# Patient Record
Sex: MALE | Race: WHITE | NOT HISPANIC OR LATINO | Employment: FULL TIME | URBAN - METROPOLITAN AREA
[De-identification: names, ages, dates, MRNs, and addresses within clinical notes are randomized per-mention and may not be internally consistent; named-entity substitution may affect disease eponyms.]

---

## 2019-07-30 ENCOUNTER — OFFICE VISIT CONVERTED (OUTPATIENT)
Dept: FAMILY MEDICINE CLINIC | Facility: CLINIC | Age: 59
End: 2019-07-30
Attending: NURSE PRACTITIONER

## 2019-07-30 ENCOUNTER — CONVERSION ENCOUNTER (OUTPATIENT)
Dept: OTHER | Facility: HOSPITAL | Age: 59
End: 2019-07-30

## 2019-07-30 ENCOUNTER — HOSPITAL ENCOUNTER (OUTPATIENT)
Dept: GENERAL RADIOLOGY | Facility: HOSPITAL | Age: 59
Discharge: HOME | End: 2019-07-30
Attending: NURSE PRACTITIONER

## 2021-01-01 ENCOUNTER — OFFICE VISIT CONVERTED (OUTPATIENT)
Dept: FAMILY MEDICINE CLINIC | Facility: CLINIC | Age: 61
End: 2021-01-01
Attending: STUDENT IN AN ORGANIZED HEALTH CARE EDUCATION/TRAINING PROGRAM

## 2021-01-01 ENCOUNTER — HOSPITAL ENCOUNTER (OUTPATIENT)
Dept: LAB | Facility: HOSPITAL | Age: 61
Discharge: HOME | End: 2021-05-20
Attending: STUDENT IN AN ORGANIZED HEALTH CARE EDUCATION/TRAINING PROGRAM

## 2021-01-01 ENCOUNTER — TRANSCRIBE ORDERS (OUTPATIENT)
Dept: ADMINISTRATIVE | Facility: HOSPITAL | Age: 61
End: 2021-01-01

## 2021-01-01 ENCOUNTER — TELEPHONE (OUTPATIENT)
Dept: FAMILY MEDICINE CLINIC | Facility: CLINIC | Age: 61
End: 2021-01-01

## 2021-01-01 ENCOUNTER — PROCEDURE VISIT CONVERTED (OUTPATIENT)
Dept: PODIATRY | Facility: CLINIC | Age: 61
End: 2021-01-01
Attending: PODIATRIST

## 2021-01-01 ENCOUNTER — OFFICE VISIT (OUTPATIENT)
Dept: FAMILY MEDICINE CLINIC | Facility: CLINIC | Age: 61
End: 2021-01-01

## 2021-01-01 ENCOUNTER — HOSPITAL ENCOUNTER (OUTPATIENT)
Dept: GENERAL RADIOLOGY | Facility: HOSPITAL | Age: 61
Discharge: HOME | End: 2021-07-02
Admitting: STUDENT IN AN ORGANIZED HEALTH CARE EDUCATION/TRAINING PROGRAM

## 2021-01-01 ENCOUNTER — OFFICE VISIT CONVERTED (OUTPATIENT)
Dept: ORTHOPEDIC SURGERY | Facility: CLINIC | Age: 61
End: 2021-01-01
Attending: STUDENT IN AN ORGANIZED HEALTH CARE EDUCATION/TRAINING PROGRAM

## 2021-01-01 ENCOUNTER — LAB (OUTPATIENT)
Dept: LAB | Facility: HOSPITAL | Age: 61
End: 2021-01-01

## 2021-01-01 ENCOUNTER — HOSPITAL ENCOUNTER (OUTPATIENT)
Dept: LAB | Facility: HOSPITAL | Age: 61
Discharge: HOME | End: 2021-01-28
Attending: STUDENT IN AN ORGANIZED HEALTH CARE EDUCATION/TRAINING PROGRAM

## 2021-01-01 ENCOUNTER — CONVERSION ENCOUNTER (OUTPATIENT)
Dept: FAMILY MEDICINE CLINIC | Facility: CLINIC | Age: 61
End: 2021-01-01

## 2021-01-01 ENCOUNTER — OFFICE VISIT CONVERTED (OUTPATIENT)
Dept: PODIATRY | Facility: CLINIC | Age: 61
End: 2021-01-01
Attending: PODIATRIST

## 2021-01-01 ENCOUNTER — HOSPITAL ENCOUNTER (EMERGENCY)
Facility: HOSPITAL | Age: 61
End: 2021-11-25
Attending: EMERGENCY MEDICINE | Admitting: EMERGENCY MEDICINE

## 2021-01-01 VITALS
HEART RATE: 95 BPM | SYSTOLIC BLOOD PRESSURE: 145 MMHG | DIASTOLIC BLOOD PRESSURE: 75 MMHG | BODY MASS INDEX: 36.5 KG/M2 | OXYGEN SATURATION: 99 % | TEMPERATURE: 97.8 F | WEIGHT: 269.5 LBS | HEIGHT: 72 IN

## 2021-01-01 VITALS
OXYGEN SATURATION: 94 % | RESPIRATION RATE: 16 BRPM | DIASTOLIC BLOOD PRESSURE: 80 MMHG | SYSTOLIC BLOOD PRESSURE: 120 MMHG | TEMPERATURE: 96.9 F | HEART RATE: 74 BPM | BODY MASS INDEX: 36.7 KG/M2 | WEIGHT: 271 LBS | HEIGHT: 72 IN

## 2021-01-01 VITALS
OXYGEN SATURATION: 98 % | HEIGHT: 72 IN | SYSTOLIC BLOOD PRESSURE: 120 MMHG | WEIGHT: 280 LBS | RESPIRATION RATE: 16 BRPM | BODY MASS INDEX: 37.93 KG/M2 | DIASTOLIC BLOOD PRESSURE: 76 MMHG | TEMPERATURE: 97.5 F | HEART RATE: 88 BPM

## 2021-01-01 VITALS
HEART RATE: 66 BPM | OXYGEN SATURATION: 80 % | WEIGHT: 268 LBS | HEIGHT: 72 IN | TEMPERATURE: 97.2 F | BODY MASS INDEX: 36.3 KG/M2

## 2021-01-01 VITALS
HEIGHT: 72 IN | BODY MASS INDEX: 36.07 KG/M2 | DIASTOLIC BLOOD PRESSURE: 63 MMHG | SYSTOLIC BLOOD PRESSURE: 142 MMHG | WEIGHT: 266.31 LBS | RESPIRATION RATE: 16 BRPM | OXYGEN SATURATION: 96 % | HEART RATE: 74 BPM | TEMPERATURE: 96.9 F

## 2021-01-01 VITALS
HEIGHT: 72 IN | HEART RATE: 75 BPM | DIASTOLIC BLOOD PRESSURE: 58 MMHG | OXYGEN SATURATION: 98 % | BODY MASS INDEX: 38.19 KG/M2 | TEMPERATURE: 96.7 F | SYSTOLIC BLOOD PRESSURE: 139 MMHG | WEIGHT: 282 LBS

## 2021-01-01 VITALS
HEART RATE: 75 BPM | OXYGEN SATURATION: 93 % | DIASTOLIC BLOOD PRESSURE: 79 MMHG | WEIGHT: 264 LBS | SYSTOLIC BLOOD PRESSURE: 160 MMHG | TEMPERATURE: 97.1 F

## 2021-01-01 VITALS
HEART RATE: 74 BPM | DIASTOLIC BLOOD PRESSURE: 63 MMHG | OXYGEN SATURATION: 98 % | HEIGHT: 72 IN | SYSTOLIC BLOOD PRESSURE: 123 MMHG | TEMPERATURE: 97.6 F | WEIGHT: 293.56 LBS | RESPIRATION RATE: 18 BRPM | BODY MASS INDEX: 39.76 KG/M2

## 2021-01-01 VITALS — OXYGEN SATURATION: 87 %

## 2021-01-01 DIAGNOSIS — I10 PRIMARY HYPERTENSION: ICD-10-CM

## 2021-01-01 DIAGNOSIS — I25.10 CORONARY ARTERY DISEASE INVOLVING NATIVE CORONARY ARTERY OF NATIVE HEART WITHOUT ANGINA PECTORIS: ICD-10-CM

## 2021-01-01 DIAGNOSIS — Z95.5 STENTED CORONARY ARTERY: ICD-10-CM

## 2021-01-01 DIAGNOSIS — I46.9 CARDIAC ARREST (HCC): Primary | ICD-10-CM

## 2021-01-01 DIAGNOSIS — E11.51 TYPE 2 DIABETES MELLITUS WITH DIABETIC PERIPHERAL ANGIOPATHY WITHOUT GANGRENE, WITHOUT LONG-TERM CURRENT USE OF INSULIN (HCC): Primary | ICD-10-CM

## 2021-01-01 DIAGNOSIS — Z12.11 SCREEN FOR COLON CANCER: ICD-10-CM

## 2021-01-01 DIAGNOSIS — M65.339 TRIGGER MIDDLE FINGER, UNSPECIFIED LATERALITY: ICD-10-CM

## 2021-01-01 DIAGNOSIS — E11.42 DIABETIC PERIPHERAL NEUROPATHY (HCC): ICD-10-CM

## 2021-01-01 DIAGNOSIS — Z23 NEEDS FLU SHOT: ICD-10-CM

## 2021-01-01 DIAGNOSIS — I51.9 CARDIAC DISEASE: ICD-10-CM

## 2021-01-01 DIAGNOSIS — E11.9 TYPE 2 DIABETES MELLITUS WITHOUT COMPLICATIONS (HCC): ICD-10-CM

## 2021-01-01 DIAGNOSIS — I10 ESSENTIAL HYPERTENSION: ICD-10-CM

## 2021-01-01 DIAGNOSIS — F17.200 SMOKING: ICD-10-CM

## 2021-01-01 DIAGNOSIS — M25.562 LEFT KNEE PAIN, UNSPECIFIED CHRONICITY: ICD-10-CM

## 2021-01-01 DIAGNOSIS — Z87.891 PERSONAL HISTORY OF TOBACCO USE, PRESENTING HAZARDS TO HEALTH: Primary | ICD-10-CM

## 2021-01-01 DIAGNOSIS — E11.65 TYPE 2 DIABETES MELLITUS WITH HYPERGLYCEMIA, WITHOUT LONG-TERM CURRENT USE OF INSULIN (HCC): ICD-10-CM

## 2021-01-01 DIAGNOSIS — E11.65 TYPE 2 DIABETES MELLITUS WITH HYPERGLYCEMIA, WITHOUT LONG-TERM CURRENT USE OF INSULIN (HCC): Primary | ICD-10-CM

## 2021-01-01 LAB
ALBUMIN SERPL-MCNC: 4.2 G/DL (ref 3.5–5)
ALBUMIN SERPL-MCNC: 4.3 G/DL (ref 3.5–5)
ALBUMIN/GLOB SERPL: 1.4 {RATIO} (ref 1.4–2.6)
ALBUMIN/GLOB SERPL: 1.6 {RATIO} (ref 1.4–2.6)
ALP SERPL-CCNC: 101 U/L (ref 56–119)
ALP SERPL-CCNC: 70 U/L (ref 56–119)
ALT SERPL-CCNC: 21 U/L (ref 10–40)
ALT SERPL-CCNC: 7 U/L (ref 10–40)
ANION GAP SERPL CALC-SCNC: 13 MMOL/L (ref 8–19)
ANION GAP SERPL CALC-SCNC: 22 MMOL/L (ref 8–19)
ANION GAP SERPL CALCULATED.3IONS-SCNC: 9.8 MMOL/L (ref 5–15)
AST SERPL-CCNC: 12 U/L (ref 15–50)
AST SERPL-CCNC: 8 U/L (ref 15–50)
BASOPHILS # BLD AUTO: 0.06 10*3/UL (ref 0–0.2)
BASOPHILS NFR BLD AUTO: 0.8 % (ref 0–3)
BILIRUB SERPL-MCNC: 0.24 MG/DL (ref 0.2–1.3)
BILIRUB SERPL-MCNC: 0.4 MG/DL (ref 0.2–1.3)
BUN SERPL-MCNC: 22 MG/DL (ref 5–25)
BUN SERPL-MCNC: 29 MG/DL (ref 5–25)
BUN SERPL-MCNC: 34 MG/DL (ref 8–23)
BUN/CREAT SERPL: 18 {RATIO} (ref 6–20)
BUN/CREAT SERPL: 23 {RATIO} (ref 6–20)
BUN/CREAT SERPL: 32.7 (ref 7–25)
CALCIUM SERPL-MCNC: 10 MG/DL (ref 8.7–10.4)
CALCIUM SERPL-MCNC: 9.4 MG/DL (ref 8.7–10.4)
CALCIUM SPEC-SCNC: 10.1 MG/DL (ref 8.6–10.5)
CHLORIDE SERPL-SCNC: 103 MMOL/L (ref 99–111)
CHLORIDE SERPL-SCNC: 104 MMOL/L (ref 98–107)
CHLORIDE SERPL-SCNC: 97 MMOL/L (ref 99–111)
CHOLEST SERPL-MCNC: 130 MG/DL (ref 107–200)
CHOLEST SERPL-MCNC: 132 MG/DL (ref 107–200)
CHOLEST/HDLC SERPL: 3.8 {RATIO} (ref 3–6)
CHOLEST/HDLC SERPL: 4.2 {RATIO} (ref 3–6)
CO2 SERPL-SCNC: 28.2 MMOL/L (ref 22–29)
CONV ABS IMM GRAN: 0.02 10*3/UL (ref 0–0.2)
CONV CO2: 23 MMOL/L (ref 22–32)
CONV CO2: 26 MMOL/L (ref 22–32)
CONV CREATININE URINE, RANDOM: 47.8 MG/DL (ref 10–300)
CONV CREATININE URINE, RANDOM: 53.4 MG/DL (ref 10–300)
CONV IMMATURE GRAN: 0.3 % (ref 0–1.8)
CONV MICROALBUM.,U,RANDOM: 224.4 MG/L (ref 0–20)
CONV MICROALBUM.,U,RANDOM: 276.9 MG/L (ref 0–20)
CONV TOTAL PROTEIN: 6.8 G/DL (ref 6.3–8.2)
CONV TOTAL PROTEIN: 7.3 G/DL (ref 6.3–8.2)
CREAT SERPL-MCNC: 1.04 MG/DL (ref 0.76–1.27)
CREAT UR-MCNC: 1.23 MG/DL (ref 0.7–1.2)
CREAT UR-MCNC: 1.26 MG/DL (ref 0.7–1.2)
DEPRECATED RDW RBC AUTO: 41.5 FL (ref 35.1–43.9)
EOSINOPHIL # BLD AUTO: 0.17 10*3/UL (ref 0–0.7)
EOSINOPHIL # BLD AUTO: 2.2 % (ref 0–7)
ERYTHROCYTE [DISTWIDTH] IN BLOOD BY AUTOMATED COUNT: 12.8 % (ref 11.6–14.4)
EST. AVERAGE GLUCOSE BLD GHB EST-MCNC: 220 MG/DL
EST. AVERAGE GLUCOSE BLD GHB EST-MCNC: 496 MG/DL
GFR SERPL CREATININE-BSD FRML MDRD: 73 ML/MIN/1.73
GFR SERPLBLD BASED ON 1.73 SQ M-ARVRAT: >60 ML/MIN/{1.73_M2}
GFR SERPLBLD BASED ON 1.73 SQ M-ARVRAT: >60 ML/MIN/{1.73_M2}
GLOBULIN UR ELPH-MCNC: 2.6 G/DL (ref 2–3.5)
GLOBULIN UR ELPH-MCNC: 3 G/DL (ref 2–3.5)
GLUCOSE SERPL-MCNC: 127 MG/DL (ref 65–99)
GLUCOSE SERPL-MCNC: 158 MG/DL (ref 70–99)
GLUCOSE SERPL-MCNC: 183 MG/DL
GLUCOSE SERPL-MCNC: 432 MG/DL (ref 70–99)
HBA1C MFR BLD: 6.8 % (ref 4.8–5.6)
HBA1C MFR BLD: 9.3 % (ref 3.5–5.7)
HBA1C MFR BLD: >18.9 % (ref 3.5–5.7)
HCT VFR BLD AUTO: 47.8 % (ref 42–52)
HDLC SERPL-MCNC: 31 MG/DL (ref 40–60)
HDLC SERPL-MCNC: 35 MG/DL (ref 40–60)
HGB BLD-MCNC: 16 G/DL (ref 14–18)
LDLC SERPL CALC-MCNC: 63 MG/DL (ref 70–100)
LDLC SERPL CALC-MCNC: 68 MG/DL (ref 70–100)
LYMPHOCYTES # BLD AUTO: 1.95 10*3/UL (ref 1–5)
LYMPHOCYTES NFR BLD AUTO: 24.8 % (ref 20–45)
MCH RBC QN AUTO: 29.8 PG (ref 27–31)
MCHC RBC AUTO-ENTMCNC: 33.5 G/DL (ref 33–37)
MCV RBC AUTO: 89 FL (ref 80–96)
MICROALBUMIN/CREAT UR: 420.2 MG/G{CRE} (ref 0–25)
MICROALBUMIN/CREAT UR: 579.3 MG/G{CRE} (ref 0–25)
MONOCYTES # BLD AUTO: 0.45 10*3/UL (ref 0.2–1.2)
MONOCYTES NFR BLD AUTO: 5.7 % (ref 3–10)
NEUTROPHILS # BLD AUTO: 5.21 10*3/UL (ref 2–8)
NEUTROPHILS NFR BLD AUTO: 66.2 % (ref 30–85)
NRBC CBCN: 0 % (ref 0–0.7)
OSMOLALITY SERPL CALC.SUM OF ELEC: 293 MOSM/KG (ref 273–304)
OSMOLALITY SERPL CALC.SUM OF ELEC: 306 MOSM/KG (ref 273–304)
PLATELET # BLD AUTO: 195 10*3/UL (ref 130–400)
PMV BLD AUTO: 11.4 FL (ref 9.4–12.4)
POTASSIUM SERPL-SCNC: 4.6 MMOL/L (ref 3.5–5.2)
POTASSIUM SERPL-SCNC: 4.7 MMOL/L (ref 3.5–5.3)
POTASSIUM SERPL-SCNC: 5.1 MMOL/L (ref 3.5–5.3)
PSA SERPL-MCNC: 0.19 NG/ML (ref 0–4)
RBC # BLD AUTO: 5.37 10*6/UL (ref 4.7–6.1)
SODIUM SERPL-SCNC: 137 MMOL/L (ref 135–147)
SODIUM SERPL-SCNC: 137 MMOL/L (ref 135–147)
SODIUM SERPL-SCNC: 142 MMOL/L (ref 136–145)
TRIGL SERPL-MCNC: 143 MG/DL (ref 40–150)
TRIGL SERPL-MCNC: 182 MG/DL (ref 40–150)
TSH SERPL-ACNC: 3.34 M[IU]/L (ref 0.27–4.2)
VLDLC SERPL-MCNC: 29 MG/DL (ref 5–37)
VLDLC SERPL-MCNC: 36 MG/DL (ref 5–37)
WBC # BLD AUTO: 7.86 10*3/UL (ref 4.8–10.8)

## 2021-01-01 PROCEDURE — 25010000002 EPINEPHRINE 1 MG/10ML SOLUTION PREFILLED SYRINGE: Performed by: EMERGENCY MEDICINE

## 2021-01-01 PROCEDURE — 92950 HEART/LUNG RESUSCITATION CPR: CPT

## 2021-01-01 PROCEDURE — 99283 EMERGENCY DEPT VISIT LOW MDM: CPT

## 2021-01-01 PROCEDURE — 31500 INSERT EMERGENCY AIRWAY: CPT

## 2021-01-01 PROCEDURE — 90471 IMMUNIZATION ADMIN: CPT | Performed by: STUDENT IN AN ORGANIZED HEALTH CARE EDUCATION/TRAINING PROGRAM

## 2021-01-01 PROCEDURE — 73560 X-RAY EXAM OF KNEE 1 OR 2: CPT

## 2021-01-01 PROCEDURE — 94799 UNLISTED PULMONARY SVC/PX: CPT

## 2021-01-01 PROCEDURE — 99214 OFFICE O/P EST MOD 30 MIN: CPT | Performed by: STUDENT IN AN ORGANIZED HEALTH CARE EDUCATION/TRAINING PROGRAM

## 2021-01-01 PROCEDURE — 83036 HEMOGLOBIN GLYCOSYLATED A1C: CPT

## 2021-01-01 PROCEDURE — 90686 IIV4 VACC NO PRSV 0.5 ML IM: CPT | Performed by: STUDENT IN AN ORGANIZED HEALTH CARE EDUCATION/TRAINING PROGRAM

## 2021-01-01 PROCEDURE — 80048 BASIC METABOLIC PNL TOTAL CA: CPT

## 2021-01-01 PROCEDURE — 36415 COLL VENOUS BLD VENIPUNCTURE: CPT

## 2021-01-01 RX ORDER — AMLODIPINE BESYLATE 10 MG/1
TABLET ORAL
COMMUNITY

## 2021-01-01 RX ORDER — DICLOFENAC SODIUM 75 MG/1
TABLET, DELAYED RELEASE ORAL
COMMUNITY
Start: 2021-01-01

## 2021-01-01 RX ORDER — EPINEPHRINE 0.1 MG/ML
SYRINGE (ML) INJECTION
Status: COMPLETED | OUTPATIENT
Start: 2021-01-01 | End: 2021-01-01

## 2021-01-01 RX ORDER — ATORVASTATIN CALCIUM 20 MG/1
TABLET, FILM COATED ORAL
COMMUNITY

## 2021-01-01 RX ORDER — GABAPENTIN 400 MG/1
CAPSULE ORAL
COMMUNITY
End: 2021-01-01

## 2021-01-01 RX ORDER — GLIPIZIDE 10 MG/1
TABLET ORAL
COMMUNITY

## 2021-01-01 RX ORDER — EMPAGLIFLOZIN 25 MG/1
TABLET, FILM COATED ORAL
COMMUNITY
Start: 2021-01-01 | End: 2021-01-01

## 2021-01-01 RX ORDER — METOPROLOL TARTRATE 50 MG/1
50 TABLET, FILM COATED ORAL 2 TIMES DAILY
COMMUNITY
Start: 2021-01-01

## 2021-01-01 RX ORDER — ASPIRIN 81 MG/1
TABLET, COATED ORAL
COMMUNITY
Start: 2021-01-01

## 2021-01-01 RX ORDER — CYCLOBENZAPRINE HCL 10 MG
10 TABLET ORAL 2 TIMES DAILY PRN
Qty: 60 TABLET | Refills: 2 | Status: SHIPPED | OUTPATIENT
Start: 2021-01-01

## 2021-01-01 RX ORDER — DOXAZOSIN MESYLATE 100 %
POWDER (GRAM) MISCELLANEOUS
COMMUNITY

## 2021-01-01 RX ORDER — LOSARTAN POTASSIUM 100 MG/1
TABLET ORAL
COMMUNITY

## 2021-01-01 RX ORDER — BLOOD-GLUCOSE METER
EACH MISCELLANEOUS
COMMUNITY
Start: 2021-01-01

## 2021-01-01 RX ORDER — EMPAGLIFLOZIN 25 MG/1
TABLET, FILM COATED ORAL
Qty: 90 TABLET | Refills: 1 | Status: SHIPPED | OUTPATIENT
Start: 2021-01-01

## 2021-01-01 RX ADMIN — SODIUM BICARBONATE 50 MEQ: 84 INJECTION, SOLUTION INTRAVENOUS at 17:33

## 2021-01-01 RX ADMIN — EPINEPHRINE 1 MG: 0.1 INJECTION INTRACARDIAC; INTRAVENOUS at 17:40

## 2021-01-01 RX ADMIN — EPINEPHRINE 1 MG: 0.1 INJECTION INTRACARDIAC; INTRAVENOUS at 17:38

## 2021-01-01 RX ADMIN — EPINEPHRINE 1 MG: 0.1 INJECTION INTRACARDIAC; INTRAVENOUS at 17:33

## 2021-01-01 RX ADMIN — EPINEPHRINE 1 MG: 0.1 INJECTION INTRACARDIAC; INTRAVENOUS at 17:35

## 2021-05-10 NOTE — H&P
History and Physical      Patient Name: Sidney Pantoja   Patient ID: [id number]   Sex: Male   YOB: 1960    Primary Care Provider: Timothy Denton MD   Referring Provider: Timothy Denton MD    Visit Date: February 1, 2021    Provider: Calvin Benton MD   Location: [facility]   Location Address: [address] [place] [address]  [id number]   Location Phone: [phone]          Chief Complaint  · BILATERAL HIP PAIN      History Of Present Illness  Sidney Pantoja is a 60 year old /White male who presents today to [facility].      Sidney presents to the office today for evaluation of his bilateral hips. He states that he was diagnosed with degenerative arthritis in October 2019. He was prescribed diclofenac and has been taking it since then. He has been tolerating it well. He states that he has not been having much pain or issues since then. He presented to his PCP recently for a check up and was referred to our office today for further evaluation. He reports that he has been doing well for the most part. He denies having much pain about the hips. He states that he has been ambulating well. He mentions that he had a flair-up about his left knee that required utilizing a cane for assistance while ambulating, however it has improved now. He denies any previous injections, physical therapy or surgeries for the hips and knees. He has uncontrolled diabetes with the A1c being more than 18. He is not on insulin.  He has baseline neuropathy. He denies any kidney issues. He is hypertensive. He has history of a cardiac stent in 2008. He is currently unemployed due to the Covid situation. He previously worked as a . He is a smoker.              Past Medical History  Arthritis; Diabetes; High cholesterol; Hypertension; Screening for colon cancer         Medication List  aspirin 81 mg oral tablet,delayed release (DR/EC); atorvastatin 20 mg oral tablet; diclofenac sodium 75 mg  oral tablet,delayed release (DR/EC); glipizide 10 mg oral tablet; metformin 1,000 mg oral tablet; metoprolol tartrate 50 mg oral tablet         Allergy List  NO KNOWN DRUG ALLERGIES; NO KNOWN DRUG ALLERGIES       Allergies Reconciled  Social History  Alcohol Use (Never); lives with spouse; .; Recreational Drug Use (Never); Tobacco (Current every day); Unemployed.         Immunizations  Name Date Admin   Influenza 01/28/2021   Influenza 01/28/2021   Influenza Refused         Review of Systems  · Constitutional  o Denies  o : fever, chills, weight loss  · Cardiovascular  o Denies  o : chest pain, shortness of breath  · Gastrointestinal  o Denies  o : liver disease, heartburn, nausea, blood in stools  · Genitourinary  o Denies  o : painful urination, blood in urine  · Integument  o Denies  o : rash, itching  · Neurologic  o Denies  o : headache, weakness, loss of consciousness  · Musculoskeletal  o Admits  o : bilateral hip pain   · Psychiatric  o Denies  o : drug/alcohol addiction, anxiety, depression      Vitals  Date Time BP Position Site L\R Cuff Size HR RR TEMP (F) WT  HT  BMI kg/m2 BSA m2 O2 Sat FR L/min FiO2 HC       02/01/2021 08:38 AM      66 - R  97.2 268lbs 0oz 6'   36.35 2.49 80 %  21%          Physical Examination  · Constitutional  o Appearance  o : well developed, well-nourished, no obvious deformities present  · Head and Face  o Head  o :   § Inspection  § : normocephalic  o Face  o :   § Inspection  § : no facial lesions  · Eyes  o Conjunctivae  o : conjunctivae normal  o Sclerae  o : sclerae white  · Ears, Nose, Mouth and Throat  o Ears  o :   § External Ears  § : appearance within normal limits  § Hearing  § : intact  o Nose  o :   § External Nose  § : appearance normal  · Neck  o Inspection/Palpation  o : normal appearance  o Range of Motion  o : full range of motion  · Respiratory  o Respiratory Effort  o : breathing unlabored  o Inspection of Chest  o : normal appearance  o Auscultation of  Lungs  o : no audible wheezing or rales  · Cardiovascular  o Heart  o : regular rate  · Gastrointestinal  o Abdominal Examination  o : soft and non-tender  · Skin and Subcutaneous Tissue  o General Inspection  o : intact, no rashes  · Psychiatric  o General  o : Alert and oriented x3  o Judgement and Insight  o : judgment and insight intact  o Mood and Affect  o : mood normal, affect appropriate  · Right Hip  o Inspection  o : Non-antalgic gait demonstrated today in the office. No pain with log roll. No pain over the greater trochanter. No pain with passive hip flexion to 100 degrees, internal rotation to neutral, external rotation to 60 degrees without pain. No pain with POLI or FADIR testing.   · Left Hip  o Inspection  o : No pain with log roll. No pain over the greater trochanter. No pain with passive hip flexion to 100 degrees, internal rotation to neutral, external rotation to 60 degrees without pain. No pain with POLI or FADIR testing.   · Right Knee  o Inspection  o : No effusion. Demonstrates full extension to 120 degrees flexion. No joint line pain. Stable to ligamentous stress. Demonstrates active ankle dorsiflexion and plantarflexion without any deficit. Baseline neuropathy of the feet, otherwise sensations to light touch intact over the dorsal and planter foot. Palpable DP pulse.   · Left Knee  o Inspection  o : No effusion. Demonstrates full extension to 120 degrees flexion. Medial joint line tenderness. No lateral joint line tenderness. Stable to ligamentous stress. Demonstrates active ankle dorsiflexion and plantarflexion without any deficit. Baseline neuropathy of the feet, otherwise sensations to light touch intact over the dorsal and planter foot. Palpable DP pulse.   · In Office Procedures  o View  o : BILATERAL AP PELVIS/AP/FROG LATERAL WITH TEMPLATE BALL  o Site  o : BILATERAL HIP  o Indication  o : BILATERAL HIP PAIN  o Study  o : X-rays ordered, taken in the office, and reviewed  today.  o Xray  o : AP pelvis, AP and frog lateral views of the bilateral hips were obtained today in the office. Mild to moderate degenerative changes noted in the bilateral hips. There is slight loss of articular height. Peripheral osteophyte formation at the margin of the acetabulum noted. No fractures. No significant bony deformities. No obvious pathologic lesions. No loose bodies. Diffuse areas of arterial calcifications noted.  o Comparative Data  o : No comparative data found          Assessment  · Primary osteoarthritis of both hips     715.15/M16.0  · Bilateral Pain: Hip     719.45/M25.559      Plan  · Orders  o Hip (Left) 2 or more views (includes AP Pelvis) [facility] (30278) - 719.45/M25.559 - 02/01/2021  o Hip (Right) 2 or more views (includes AP Pelvis) [facility]. (00561) - 719.45/M25.559 - 02/01/2021  o Tobacco cessation counseling completed (9314F) - - 02/01/2021  · Medications  o Medications have been Reconciled  o Transition of Care or Provider Policy  · Instructions  o X-rays reviewed by Dr. Benton.  o Reviewed the patient's Past Medical, Social, and Family history as well as the ROS at today's visit, no changes.  o Call or return if worsening symptoms.  o Exercise handout given.  o Follow Up PRN.  o The above service was scribed by Basilio Arias on my behalf and I attest to the accuracy of the note. cb  o Sidney has mild to moderate bilateral hip arthritis. His symptoms are well controlled with oral diclofenac. We will add some home exercises for the bilateral hips today. Otherwise we do not need to pursue any further interventions given his lack of symptoms. He may call with any concerns going forward. He was agreeable with the plan. We discussed the importance of glucose control and smoking cessation for his overall health.  o Electronically Identified Patient Education Materials Provided Electronically            Electronically Signed by: Calvin Benton MD -Author on  February 3, 2021 07:01:14 AM

## 2021-05-10 NOTE — H&P
"   History and Physical      Patient Name: Sidney Pantoja   Patient ID: [id number]   Sex: Male   YOB: 1960    Primary Care Provider: Timothy Denton MD   Referring Provider: Timothy Denton MD    Visit Date: February 9, 2021    Provider: Seth Arana DPM   Location: [facility]   Location Address: [address] [place] [address]  [id number]   Location Phone: [phone]          Chief Complaint  · Diabetic Foot Evaluation      History Of Present Illness  Sidney Pantoja presents to the office today as a new patient for a diabetic foot evaluation. On referral from Timothy Denton MD   Patient reports that he is a diabetic currently controlling diabetes with oral medication      18.9.  PCP is aware of this.    Patient denies any fevers, chills, nausea, vomiting, shortness of breathe, nor any other constitutional signs nor symptoms.    Previously worked at [employer].\">New, Established, New Problem: new   Location: bilateral feet  Duration:   Onset: gradual  Nature: NIDDM  Stable, worsening, improving: stable  Aggravating factors:  Previous Treatment: oral medication    Pt states their most recent HgB A1C was >18.9.  PCP is aware of this.    Patient denies any fevers, chills, nausea, vomiting, shortness of breathe, nor any other constitutional signs nor symptoms.    Previously worked at [employer].      New, Established, New Problem:  SECOND PROBLEM  Location: Bilateral feet  Duration:   Greater than 1 month  Onset: Insidious  Nature: Dry skin  Stable, worsening, improving:  Worsening  Aggravating factors:     Previous Treatment:  Slowly worsening despite using OTC skin lotion.         Past Medical History  Arthritis; Diabetes; High cholesterol; Hypertension; Numbness in feet; Screening for colon cancer; Type 2 diabetes mellitus         Past Surgical History  heart surgery         Medication List  aspirin 81 mg oral tablet,delayed release (DR/EC); atorvastatin 20 mg oral tablet; diclofenac sodium " 75 mg oral tablet,delayed release (DR/EC); glipizide 10 mg oral tablet; metformin 1,000 mg oral tablet; metoprolol tartrate 50 mg oral tablet         Allergy List  NO KNOWN DRUG ALLERGIES         Family Medical History  Heart Disease; FH: stroke         Social History  Alcohol Use (Never); lives with spouse; .; Recreational Drug Use (Never); Tobacco (Current every day); Unemployed.         Immunizations  Name Date Admin   Influenza 01/28/2021   Influenza 01/28/2021   Influenza Refused         Review of Systems  · Constitutional  o Denies  o : fatigue, night sweats  · Eyes  o Denies  o : double vision, blurred vision  · HENT  o Denies  o : vertigo, recent head injury  · Cardiovascular  o Denies  o : chest pain, irregular heart beats  · Respiratory  o Denies  o : shortness of breath, productive cough  · Gastrointestinal  o Denies  o : nausea, vomiting  · Genitourinary  o Denies  o : dysuria, urinary retention  · Integument  o Denies  o : hair growth change, new skin lesions  · Neurologic  o * See HPI  · Musculoskeletal  o Denies  o : joint swelling, limitation of motion  · Endocrine  o Denies  o : cold intolerance, heat intolerance  · Heme-Lymph  o Denies  o : petechiae, lymph node enlargement or tenderness  · Allergic-Immunologic  o Denies  o : frequent illnesses      Vitals  Date Time BP Position Site L\R Cuff Size HR RR TEMP (F) WT  HT  BMI kg/m2 BSA m2 O2 Sat FR L/min FiO2 HC       02/09/2021 02:14 /75 Sitting    95 - R  97.8 269lbs 8oz 6'   36.55 2.49 99 %            Physical Examination  · Constitutional  o Appearance  o : overweight, well developed  · Cardiovascular  o Peripheral Vascular System  o :   § Extremities  § : no edema noted  · Musculoskeletal  o Extremeties/Joint  o : Lower extremity muscle strength and range of motion is equal and symmetrical bilaterally. The knees are noted to be in normal alignment. Ankle alignment and range of motion is normal and foot structure is normal. Subtalar,  metatarsal and metatarsal-phalangeal joint range of motion is noted to be within normal limits. The digits of both feet are in normal alignment. The gait is normal.  · Left DM Foot Exam  o Sensation  o : [place] 5.07 monofilament diminished to all assessed areas. Sharp/dull sensation is decreased.   o Visual Inspection  o : Skin is noted to have normal texture and turgor, with no excrescences noted. The toenails are noted to be without disease  o Vascular  o : palpable dorsalis pedis and posterir tibialis pulses present, normal capillary refill  · Right DM Foot Exam  o Sensation  o : [place] 5.07 monofilament diminished to all assessed areas. Sharp/dull sensation is decreased.   o Visual Inspection  o : Skin is noted to have normal texture and turgor, with no excrescences noted. The toenails are noted to be without disease  o Vascular  o : palpable dorsalis pedis and posterir tibialis pulses present, normal capillary refill     Dry skin seen without fissures nor cracks.   No signs of edema, erythema, lymphangitis, nor signs of infection.           Assessment  · Diabetes     250.00/E11.9  · Foot pain, left     729.5/M79.672  · Foot pain, right     729.5/M79.671  · Neuropathy     355.9/G62.9  · Anhidrosis     705.0/L74.4      Plan  · Orders  o Diabetic Foot (Motor and Sensory) Exam Completed [facility] (, , 2028F) - - 02/09/2021  o BMI above 25 and plan documented () - - 02/09/2021  · Medications  o ammonium lactate 12 % topical lotion   SIG: apply to affected area by topical route 2 times a day for 30 days   DISP: (1) Bottle with 11 refills  Prescribed on 02/09/2021     o Medications have been Reconciled  o Transition of Care or Provider Policy  · Instructions  o Patient is to return in one year for their Podiatric Diabetic Evaluation. Diabetic foot exam performed and documented this date, compliant with CQM required standards. Detail of findings as noted in physical exam.Lower extremity  Neuro exam for diabetic patient performed and documented this date, compliant with PQRS required standards. Detail of findings as noted in physical exam.Advised patient importance of good routine lower extremity hygiene. Advised patient importance of evaluating for intact skin and pain free nail borders. Advised patient to use mirror to evaluate plantar/ soles of feet for better visualization. Advised patient monitor and phone office to be seen if any cracking to skin, open lesions, painful nail borders or if nails become elongated prior to next visit. Advised patient importance of daily cleansing of lower extremities, followed by good skin cream to maintain normal hydration of skin. Also advised patient importance of close daily monitoring of blood sugar. Advised to regulate diet and medications to maintain control of blood sugar in optimal range. Contact primary care provider if difficulties maintaining blood sugar levels.Advised Patient of presence of Diabetes Mellitus condition. Advised Patient risk of progression and worsening or improvement, then return of condition. Will monitor condition for any change in future. Treat with most appropriate treatment pending status of condition.Counseled and advised patient extensively on nature and ramifications of diabetes. Standard instructions given to patient for good diabetic foot care and maintenance. Advised importance of careful monitoring to avoid break down and complications secondary to diabetes. Advised patient importance of strict maintenance of blood sugar control. Advised patient of possible ominous results from neglect of condition,i.e.: amputation/ loss of digits, feet and legs, or even death.Patient states understands counseling, will monitor closely, continue good hygiene and routine diabetic foot care. Patient will contact office is questions or problems.   o Electronically Identified Patient Education Materials Provided  Electronically  · Disposition  o Call or Return if symptoms worsen or persist.            Electronically Signed by: Seth Arana DPM -Author on February 9, 2021 03:02:31 PM

## 2021-05-14 NOTE — PROGRESS NOTES
Progress Note      Patient Name: Sidney Pantoja   Patient ID: [id number]   Sex: Male   YOB: 1960    Primary Care Provider: Timothy Denton MD   Referring Provider: Timothy Denton MD    Visit Date: February 23, 2021    Provider: Timothy Denton MD   Location: [facility]   Location Address: [address] [place] [address], [address] [place] [address]  [id number]   Location Phone: [phone]          Chief Complaint     2 week f/u on Obesity, HLD, HTN       History Of Present Illness  Sidney Pantoja is a 60 year old /White male who presents for evaluation and treatment of:      60 years old male with multiple comorbidities/noncompliance comes to the clinic today to follow-up on chronic conditions/recent blood work and uncontrolled diabetes.    Uncontrolled diabetes mellitus type 2; last A1c was more than 18.0, patient did not see any doctor in last 2 years.  Patient did not take any medication in last 2 years.  Patient was started on Metformin and glipizide after the A1c was documented.  Patient is currently takingmedications and following healthy diet as per patient.  Patient does not want any insulin at this time, will consider adding third agent today.    Recent blood work reviewed with patient again.    Patient was seen by ophthalmologist recently.  Patient was seen by podiatry and orthopedic as well.    History of cardiac disease; history of stents in 2018, patient is not following up with any cardiologist.    Chronic smoking; smoking cessation discussed with patient.       Past Medical History  Disease Name Date Onset Notes   Arthritis --  --    Diabetes --  --    High cholesterol --  --    Hypertension --  --    Numbness in feet --  --    Screening for colon cancer 2018 --    Type 2 diabetes mellitus --  --          Past Surgical History  Procedure Name Date Notes   heart surgery --  --          Medication List  Name Date Started Instructions   ammonium lactate 12 % topical lotion  02/09/2021 apply to affected area by topical route 2 times a day for 30 days   aspirin 81 mg oral tablet,delayed release (DR/EC) 01/28/2021 take 1 tablet (81 mg) by oral route once daily for 30 days   atorvastatin 20 mg oral tablet 02/23/2021 TAKE 1 TABLET BY MOUTH EVERY NIGHT AT BEDTIME for 90 days   diclofenac sodium 75 mg oral tablet,delayed release (DR/EC) 01/28/2021 TAKE 1 TABLET(75 MG) BY MOUTH TWICE DAILY for 30 days   glipizide 10 mg oral tablet 01/28/2021 take 1 tablet (10 mg) by oral route once daily before a meal for 90 days   metformin 1,000 mg oral tablet 01/28/2021 TAKE 1 TABLET BY MOUTH TWICE DAILY WITH MEALS for 30 days   metoprolol tartrate 50 mg oral tablet 01/28/2021 TAKE 1 TABLET BY MOUTH TWICE DAILY for 30 days         Allergy List  Allergen Name Date Reaction Notes   NO KNOWN DRUG ALLERGIES --  --  --          Family Medical History  Disease Name Relative/Age Notes   Heart Disease Father/   Father   FH: stroke  --          Social History  Finding Status Start/Stop Quantity Notes   Alcohol Use Never --/-- --  does not drink   lives with spouse --  --/-- --  --    . --  --/-- --  --    Recreational Drug Use Never --/-- --  no   Tobacco Current every day --/-- --  current every day smoker   Unemployed. --  --/-- --  --          Immunizations  NameDate Admin Mfg Trade Name Lot Number Route Inj VIS Given VIS Publication   [id number]/28/2021 [facility] Fluzone Quadrivalent [id number] IM [place] 01/28/2021 08/15/2019   Comments:          Review of Systems  · Constitutional  o Denies  o : fatigue, fever  · Eyes  o Denies  o : discharge from eye, redness  · HENT  o Denies  o : headaches, congestion  · Cardiovascular  o Denies  o : chest pain, palpitations  · Respiratory  o Denies  o : shortness of breath, wheezing, cough  · Gastrointestinal  o Denies  o : vomiting, diarrhea, constipation  · Genitourinary  o Denies  o : dysuria, hematuria  · Integument  o Denies  o : rash, new skin  lesions  · Neurologic  o Denies  o : altered mental status, seizures  · Musculoskeletal  o Denies  o : weakness, joint swelling  · Psychiatric  o Denies  o : anxiety, depression  · Heme-Lymph  o Denies  o : lymph node enlargement, tenderness      Vitals  Date Time BP Position Site L\R Cuff Size HR RR TEMP (F) WT  HT  BMI kg/m2 BSA m2 O2 Sat FR L/min FiO2 HC       02/23/2021 08:25 /63 Sitting    74 - R 16 96.9 266lbs 5oz 6'   36.12 2.48 96 %  21%          Physical Examination  · Constitutional  o Appearance  o : alert, in no acute distress, well developed, well-nourished  · Head and Face  o Head  o : normocephalic, atraumatic, non tender, no palpable masses or nodules.  o Face  o : no facial lesions  · Eyes  o Vision  o : Acuity: grossly normal at distance, Conjuntivae: Normal, Sclerae white, Pupils: PERRL, Cornea: Clear, no lesions bilateral  · Neck  o Inspection/Palpation  o : Supple, no masses or tenderness, no deformities, Trachea: Midline, ROM: with in normal limits, no neck stiffness  o Thyroid  o : no thyomegaly, no palpabale masses   · Respiratory  o Auscultation of Lungs  o : normal breath sounds throughout  · Cardiovascular  o Heart  o : Regular rate and rhythm, Normal S1,S2 , No cardiac murmers, No S3 or S4 gallop or rubs  · Gastrointestinal  o Abdominal Examination  o : abdomen soft, nontender, non distended, no rigidity, gaurding, rebound tenderness, no ventral or inguinal hernias present  o Liver and spleen  o : no hepatomegaly present, liver nontender to palpation, spleen not palpable  · Musculoskeletal  o General  o :   § General Musculoskeletal  § : No joint swelling or deformity., Muscle tone, strength, and development grossly normal.  · Skin and Subcutaneous Tissue  o General Inspection  o : no rashes , or lesions present, normal skin color, warm and dry  o Digits and Nails  o : no clubbing, cyanosis, deformities or edema present, normal appearing nails  · Neurologic  o Mental Status  Examination  o : alert and oriented to time, place, and person., Cranial Nerves: grossly intact,   · Psychiatric  o Mood and Affect  o : normal mood and affect          Results  · In-Office Procedures  o Lab procedure  § IOP - Glucose, Fingerstick (33648)   § Glucose SerPl-mCnc: 183 mg/dL, H      Assessment  · Diabetes mellitus type 2, uncontrolled, with complications       Type 2 diabetes mellitus with unspecified complications     250.92/E11.8  Type 2 diabetes mellitus with hyperglycemia     250.92/E11.65  Uncontrolled, last A1c was more than 18.0   Patient does not want insulin; risks versus benefits discussed    We will continue with Metformin and glipizide; will add Jardiance    -Daily blood sugar monitoring discussed; repeat A1c in 3 months  · Essential hypertension     401.9/I10  -Uncontrolled, chronic    We will add lisinopril on top of beta-blocker that patient is taking    Home blood pressure logs discussed  · Hyperlipidemia     272.4/E78.5  Continue with statin 20 mg at this time; will increase it to 40 mg in 3 months  · Obesity     278.00/E66.9  Lifestyle modifications with healthy diet and daily exercise discussed  · Microalbuminuria     791.0/R80.9  We will add lisinopril and better diabetes controlled required  · Cardiac disease     429.9/I51.9  Patient has a history of coronary artery disease and cardiac stent in 2018; will continue aspirin/statin/beta-blocker and will consider cardiologist in the next appointment      Plan  · Orders  o BMP [facility] (83233) - 401.9/I10, 250.92/E11.8, 250.92/E11.65, 791.0/R80.9 - 04/23/2021  o Hgb A1c [facility] (00943) - 401.9/I10, 250.92/E11.8, 250.92/E11.65 - 04/23/2021  o ACO-14: Influenza immunization administered or previously received [facility] () - - 02/23/2021  o ACO-41: Dilated Diabetic eye exam completed this year and results in chart/reviewed (2022F) - - 02/23/2021  o ACO-39: Current medications updated and reviewed (, 2999F) - -  02/23/2021  · Medications  o lisinopril 10 mg oral tablet   SIG: take 1 tablet (10 mg) by oral route once daily for 90 days   DISP: (90) Tablet with 1 refills  Prescribed on 02/23/2021     o Jardiance 10 mg oral tablet   SIG: take 1 tablet (10 mg) by oral route once daily in the morning for 90 days   DISP: (90) Tablet with 1 refills  Prescribed on 02/23/2021     o atorvastatin 20 mg oral tablet   SIG: TAKE 1 TABLET BY MOUTH EVERY NIGHT AT BEDTIME for 90 days   DISP: (90) Tablet with 0 refills  Refilled on 02/23/2021     o Medications have been Reconciled  o Transition of Care or Provider Policy  · Instructions  o Patient advised to monitor blood pressure (B/P) at home and journal readings. Patient informed that a B/P reading at home of more than 130/80 is considered hypertension. For readings greater [id number]/90 or higher patient is advised to follow up in the office with readings for management. Patient advised to limit sodium intake.  o Patient was educated and given low cholesterol diet information.  o Recommended exercise program to assist with cholesterol, weight loss and overall health improvement.  o Advised that cheeses and other sources of dairy fats, animal fats, fast food, and the extras (candy, pastries, pies, doughnuts and cookies) all contain LDL raising nutrients. Advised to increase fruits, vegetables, whole grains, and to monitor portion sizes.   o Patient was educated/instructed on their diagnosis, treatment and medications prior to discharge from the clinic today.  o Patient was instructed to exercise regularly.  o Patient instructed to seek medical attention urgently for new or worsening symptoms.  o Call the office with any concerns or questions.  o Bring all medicines with their bottles to each office visit.  o Minutes spent with patient including greater than 50% in Education/Counseling/Care Coordination.  o Time spent with the patient was minutes, more than 50% face to face.  o Discussed  Covid-19 precautions including, but not limited to, social distancing, avoid touching your face, and hand washing.   · Disposition  o Call or Return if symptoms worsen or persist.  o Follow Up in 3 months.            Electronically Signed by: Timothy Denton MD -Author on February 23, 2021 10:12:31 AM

## 2021-05-14 NOTE — PROGRESS NOTES
Progress Note      Patient Name: Sidney Pantoja   Patient ID: [id number]   Sex: Male   YOB: 1960    Primary Care Provider: Elisa VALADEZ    Visit Date: January 28, 2021    Provider: Timothy Denton MD   Location: [facility]   Location Address: [address] [place] [address], [address]  [place], [place]  [id number]   Location Phone: [phone]          Chief Complaint  · f/u  · needs to resume meds  · (B) hip pain and left knee pain x 1 year.  · neuropathy in feet  · toe nails thick, callus build up and dry scaly skin on bilateral feet      History Of Present Illness  Sidney Pantoja is a 60 year old /White male who presents for evaluation and treatment of:      60 years old male with past medical history of hypertension, hyperlipidemia, cardiac disease, coronary artery disease, status post cardiac stent, neuropathy, hypertension, bilateral hip arthritis, chronic knee pain, obesity, chronic smoking comes to the clinic to establish care with new provider and follow-up on chronic conditions.    Patient has not seen any doctor in last 2 years.  Patient is not taking any medications at this time.    Patient is noncompliant with our recommendations.    Patient currently does not know if he is taking any medications or not he reports he might be taking Metformin only at this time.  But patient has not seen any physician in last 2 years.    Patient denies any chest pain or shortness of breath on exertion.  Patient reports he had cardiac stents done few years ago.       Past Medical History  Arthritis; Diabetes; High cholesterol; Hypertension; Screening for colon cancer         Medication List  atorvastatin 20 mg oral tablet; diclofenac sodium 75 mg oral tablet,delayed release (DR/EC); metformin 1,000 mg oral tablet; metoprolol tartrate 50 mg oral tablet         Allergy List  NO KNOWN DRUG ALLERGIES         Social History  Tobacco (Former)         Immunizations  Name Date Admin    Influenza 01/28/2021   Influenza 01/28/2021   Influenza Refused         Review of Systems  · Constitutional  o Denies  o : fever, fatigue, weight loss, weight gain  · HENT  o Denies  o : headaches, vertigo, lightheadedness  · Cardiovascular  o Denies  o : lower extremity edema, claudication, chest pressure, palpitations  · Respiratory  o Denies  o : shortness of breath, wheezing, cough, hemoptysis, dyspnea on exertion  · Gastrointestinal  o Denies  o : nausea, vomiting, diarrhea, constipation, abdominal pain  · Neurologic  o Denies  o : altered mental status  · Musculoskeletal  o Admits  o : hip pain, knee pain  · Psychiatric  o Denies  o : anxiety      Vitals  Date Time BP Position Site L\R Cuff Size HR RR TEMP (F) WT  HT  BMI kg/m2 BSA m2 O2 Sat FR L/min FiO2 HC       01/28/2021 09:08 /79 Sitting    75 - R  97.1 264lbs 0oz    93 %            Physical Examination  · Constitutional  o Appearance  o : well-nourished, well developed, alert, in no acute distress  · Eyes  o Conjunctivae  o : conjunctivae normal  o Sclerae  o : sclerae white  o Pupils and Irises  o : pupils equal, round, and reactive to light and accommodation bilaterally  o Corneas  o : tear film normal, no lesions present  o Eyelids/Ocular Adnexae  o : eyelid appearance normal, no exudates present, eye moisture level normal  · Neck  o Inspection/Palpation  o : normal appearance, no masses or tenderness, trachea midline, no enlarged cervical or supraclavicular lymphnodes palpated  o Thyroid  o : gland size normal, nontender, no nodules or masses present on palpation, thyroid motion normal during swallowing  · Respiratory  o Respiratory Effort  o : breathing unlabored  o Inspection of Chest  o : normal appearance, no retractions  o Auscultation of Lungs  o : normal breath sounds throughout  · Cardiovascular  o Heart  o :   § Auscultation of Heart  § : regular rate and rhythm without murmur, PMI normal  · Musculoskeletal  o General  o : No joint  swelling or deformity noted. Muscle tone, strength and development grossly normal.  · Skin and Subcutaneous Tissue  o General Inspection  o : Bilateral lower extremity stasis  · Neurologic  o Mental Status Examination  o : judgement, insight intact, modd and affect appropriate  o Motor Examination  o : strength grossly intact in all four extremities  o Gait and Station  o : normal gait, able to stand without difficulty          Assessment  · Screening for depression     V79.0/Z13.89  · Need for influenza vaccination     V04.81/Z23  · Screening for prostate cancer     V76.44/Z12.5  · Diabetes mellitus, type 2     250.00/E11.9  · Nicotine dependence     305.1/F17.200  · Obesity     278.00/E66.9  · Tobacco abuse counseling       Tobacco abuse counseling     V65.42/Z71.6  · High cholesterol     272.0/E78.0  · Hypertension     401.9/I10  · HLD (hyperlipidemia)     272.4/E78.5  · HTN (hypertension)     401.9/I10  · CAD (coronary artery disease)     414.00/I25.10  · Cardiac disease     429.9/I51.9  · Hip pain     719.45/M25.559  · Arthritis     716.90/M19.90  · Knee pain     719.46/M25.569  · Neuropathy     355.9/G62.9  · Smoker     305.1/F17.200  · Venous stasis     459.81/I87.8       ----Patient has not seen any provider in last 2 years, currently possibly not taking any medications at all  ----Due to his history we will start him on Metformin/lisinopril/aspirin/metoprolol/diclofenac/atorvastatin  ----Further evaluation and treatment plan based on blood work and next visit  ----We will send patient to orthopedics/podiatry/eye exam  ----We will consider cardiologist referral         Plan  · Orders  o Immunization Admin Fee (Single) ([facility]) (71600) - V04.81/Z23 - 01/28/2021  o Fluzone Quadrivalent Vaccine, age 6 months + (53554) - V04.81/Z23 - 01/28/2021   Vaccine - Influenza; Dose: 0.5; Site: Left Arm; Route: Intramuscular; Date: 01/28/2021 09:46:00; Exp: 06/30/2021; Lot: [id number]; Mfg: sanofi pasteur; TradeName: Fluzone  Quadrivalent; Administered By: Viridiana Ayala MA; Comment: N/A   Vaccine - Influenza; Dose: 0.5; Site: Left Arm; Route: Intramuscular; Date: 01/28/2021 09:46:00; Exp: 06/30/2021; Lot: [id number]; Mfg: IMT ([employer]) pasteur; TradeName: Fluzone Quadrivalent; Administered By: Viridiana Ayala MA; Comment: N/A  o Urine microalbumin (96006) - 250.00/E11.9 - 01/28/2021  o Diabetic Dilated Eye Exam Consult with Ophthalmology/Optometry ([facility]ME[facility]) - 250.00/E11.9 - 01/28/2021  o PODIATRY CONSULTATION (PODIA) - 250.00/E11.9 - 01/28/2021  o Diabetic Foot (Motor and Sensory) Exam Completed [facility] (, , 2028F) - 250.00/E11.9 - 01/28/2021  o ACO-17: Screened for tobacco use AND received tobacco cessation intervention (4004F) - 305.1/F17.200 - 01/28/2021  o Low dose CT scan (LDCT) for lung cancer screening [facility] () - 305.1/F17.200 - 01/28/2021  o ACO-17: Screened for tobacco use AND received tobacco cessation intervention (4004F) - V65.42/Z71.6 - 01/28/2021  o Hgb A1c [facility] (43021) - 250.00/E11.9 - 01/28/2021  o Male Physical Primary Care Panel (CMP, CBC, TSH, Lipid, PSA) [facility] (79394, 66149, 05398, 34165, 58051, ) - 401.9/I10, 250.00/E11.9, V76.44/Z12.5, 414.00/I25.10 - 01/28/2021  o ACO-17: Screened for tobacco use AND received tobacco cessation intervention (4004F) - - 01/28/2021  o ACO-18: Negative screen for clinical depression using a standardized tool () - - 01/28/2021  o ACO-14: Influenza immunization administered or previously received [facility] () - - 01/28/2021  o ACO-39: Current medications updated and reviewed (1159F, ) - - 01/28/2021  o Urine microalbumin (88394) - 250.00/E11.9 - 01/28/2021  o ORTHOPEDIC CONSULTATION (ORTHO) - 719.45/M25.559, 716.90/M19.90, 719.46/M25.569, 355.9/G62.9 - 01/28/2021  o PODIATRY CONSULTATION (PODIA) - 250.00/E11.9 - 01/28/2021   failed diabetic foot exam  · Medications  o aspirin 81 mg oral tablet,delayed release (DR/EC)   SIG: take 1 tablet (81 mg) by oral route once daily for 30  days   DISP: (30) Tablet with 2 refills  Prescribed on 01/28/2021     o atorvastatin 20 mg oral tablet   SIG: TAKE 1 TABLET BY MOUTH EVERY NIGHT AT BEDTIME for 30 days   DISP: (30) Tablet with 1 refills  Refilled on 01/28/2021     o diclofenac sodium 75 mg oral tablet,delayed release (DR/EC)   SIG: TAKE 1 TABLET(75 MG) BY MOUTH TWICE DAILY for 30 days   DISP: (60) Tablet with 1 refills  Refilled on 01/28/2021     o metformin 1,000 mg oral tablet   SIG: TAKE 1 TABLET BY MOUTH TWICE DAILY WITH MEALS for 30 days   DISP: (60) Tablet with 3 refills  Refilled on 01/28/2021     o metoprolol tartrate 50 mg oral tablet   SIG: TAKE 1 TABLET BY MOUTH TWICE DAILY for 30 days   DISP: (60) Tablet with 3 refills  Refilled on 01/28/2021     o amlodipine 10 mg oral tablet   SIG: take 1 tablet (10 mg) by oral route once daily   DISP: (0) tablet with 0 refills  Discontinued on 01/28/2021     o Blood Glucose Test miscellaneous strip   SIG: glucose test strips-test weekly and as needed   DISP: (50) 25 ct box with 5 refills  Discontinued on 01/28/2021     o blood-glucose meter miscellaneous misc   SIG: use as directed for 90 days   DISP: (1) Box with 0 refills  Discontinued on 01/28/2021     o blood-glucose meter miscellaneous misc   SIG: USE AS DIRECTED FOR 90 DAYS   DISP: (1) Box with 2 refills  Discontinued on 01/28/2021     o doxazosin 1 mg oral tablet   SIG: TAKE 1 TABLET BY MOUTH EVERY NIGHT   DISP: (30) Tablet with -1 refills  Discontinued on 01/28/2021     o doxazosin mesylate (bulk) 100 % miscellaneous powder   SIG: use as directed   DISP: (1) 1 gm jar with 0 refills  Discontinued on 01/28/2021     o gabapentin 300 mg oral capsule   SIG: take 1 capsule (300 mg) by oral route 3 times per day   DISP: (0) capsule with 0 refills  Discontinued on 01/28/2021     o gabapentin 300 mg   SIG: tab 1 po in am and 2pm   DISP: (60) Tablet with 0 refills  Discontinued on 01/28/2021     o glipizide 10 mg oral tablet   SIG: take 1 tablet (10 mg)  by oral route 2 times per day before meals   DISP: (0) tablet with 0 refills  Discontinued on 01/28/2021     o hydrochlorothiazide 25 mg oral tablet   SIG: TAKE 1 TABLET BY MOUTH EVERY DAY   DISP: (30) Tablet with -1 refills  Discontinued on 01/28/2021     o Januvia 100 mg oral tablet   SIG: TAKE 1 TABLET BY MOUTH DAILY   DISP: (30) Tablet with -1 refills  Discontinued on 01/28/2021     o losartan 100 mg oral tablet   SIG: TAKE 1 TABLET BY MOUTH EVERY DAY   DISP: (30) Tablet with -1 refills  Discontinued on 01/28/2021     o Medications have been Reconciled  o Transition of Care or Provider Policy  · Instructions  o Depression Screen completed and scanned into the EMR under the designated folder within the patient's documents.  o Today's PHQ-9 result is _3__  o Continue blood sugar monitoring daily and record. Bring your log to office visits. Call the office for readings below 70 and above 250 or any complications.  o Daily foot care. Avoid walking barefoot. Annual Dilated Eye Exam.  o Discussed with patient blood pressure monitoring, hemoglobin A1C levels need to be below 7.0, and LDL (Lipid) goals below 70.  o *Form of nicotine being used:   o Patient was strongly encouraged to discontinue use of any nicotine containing product or minimize the use of the product.  o Discussed smoking cessation and counseling with patient for over 3 minutes.  o Tobacco and smoking cessation counseling for more than 3 minutes was completed.  o Patient was educated/instructed on their diagnosis, treatment and medications prior to discharge from the clinic today.  o Patient was instructed to exercise regularly.  o Patient instructed to seek medical attention urgently for new or worsening symptoms.  o Call the office with any concerns or questions.  o Minutes spent with patient including greater than 50% in Education/Counseling/Care Coordination.  o Time spent with the patient was minutes, more than 50% face to face.  o Discussed  Covid-19 precautions including, but not limited to, social distancing, avoid touching your face, and hand washing.   · Disposition  o Call or Return if symptoms worsen or persist.  o Follow Up in 2 weeks.            Electronically Signed by: Timothy Denton MD -Author on January 28, 2021 12:03:09 PM

## 2021-05-14 NOTE — PROGRESS NOTES
Progress Note      Patient Name: Sidney Pantoja   Patient ID: [id number]   Sex: Male   YOB: 1960    Primary Care Provider: Timothy Denton MD   Referring Provider: Timothy Denton MD    Visit Date: March 23, 2021    Provider: Seth Arana DPM   Location: [facility]   Location Address: [address] [place] [address]  [id number]   Location Phone: [phone]          Chief Complaint  · Routine Foot Care Visit      History Of Present Illness  Sidney Pantoja complains of painful, elongated toenails which are thickened, yellowed, chalky, and cause pain with shoe gear and ambulation.      New, Established, New Problem:  New problem  Location:  Toenails  Duration:  Greater than five years  Onset:  Gradual  Nature:  sore with palpation.  Stable, worsening, improving:   Worsening  Aggravating factors:  Pain with shoe gear and ambulation.  Previous Treatment:  cannot trim toenails on his own    Pt states their most recent blood glucose reading was 180.    Patient denies any fevers, chills, nausea, vomiting, shortness of breathe, nor any other constitutional signs nor symptoms.    Patient reports the following medical changes since their last visit:    - addition of Jardiance       Past Medical History  Arthritis; Diabetes; High cholesterol; Hypertension; Numbness in feet; Screening for colon cancer; Type 2 diabetes mellitus         Past Surgical History  heart surgery         Medication List  ammonium lactate 12 % topical lotion; aspirin 81 mg oral tablet,delayed release (DR/EC); atorvastatin 20 mg oral tablet; diclofenac sodium 75 mg oral tablet,delayed release (DR/EC); glipizide 10 mg oral tablet; Jardiance 10 mg oral tablet; lisinopril 10 mg oral tablet; metformin 1,000 mg oral tablet; metoprolol tartrate 50 mg oral tablet         Allergy List  NO KNOWN DRUG ALLERGIES       Allergies Reconciled  Family Medical History  Heart Disease; FH: stroke         Social History  Alcohol Use (Never);  lives with spouse; .; Recreational Drug Use (Never); Tobacco (Current every day); Unemployed.         Immunizations  Name Date Admin   Influenza 01/28/2021   Influenza 01/28/2021   Influenza Refused         Review of Systems  · Constitutional  o Denies  o : fatigue, night sweats  · Eyes  o Denies  o : double vision, blurred vision  · HENT  o Denies  o : vertigo, recent head injury  · Cardiovascular  o Denies  o : chest pain, irregular heart beats  · Respiratory  o Denies  o : shortness of breath, productive cough  · Gastrointestinal  o Denies  o : nausea, vomiting  · Genitourinary  o Denies  o : dysuria, urinary retention  · Integument  o * See HPI  · Neurologic  o * See HPI  · Musculoskeletal  o Denies  o : joint swelling, limitation of motion  · Endocrine  o Denies  o : cold intolerance, heat intolerance  · Heme-Lymph  o Denies  o : petechiae, lymph node enlargement or tenderness  · Allergic-Immunologic  o Denies  o : frequent illnesses      Vitals  Date Time BP Position Site L\R Cuff Size HR RR TEMP (F) WT  HT  BMI kg/m2 BSA m2 O2 Sat FR L/min FiO2        03/23/2021 07:57 /58 Sitting    75 - R  96.7 282lbs 0oz 6'   38.25 2.55 98 %            Physical Examination  · Constitutional  o Appearance  o : overweight, well developed  · Respiratory  o Respiratory Effort  o : No labored breathing. Good respiratory effort.   · Cardiovascular  o Peripheral Vascular System  o :   § Pedal Pulses  § : Pedal Pulses are 2+ and symmetrical  § Extremities  § : There is no edema of the lower extremities  · Musculoskeletal  o Extremeties/Joint  o : Lower extremity muscle strength and range of motion is equal and symmetrical bilaterally. The knees are noted to be in normal alignment. Ankle alignment and range of motion is normal and foot structure is normal.  · Neurologic  o Sensation  o : Sharp/dull sensation is diminished bilaterally. Monofilament sensation examination of the left foot is diminished. Monofilament  sensation examination of the right foot is diminished.  · Toes  o Toes: Right Foot  o :   § Toenails  § : Toenails are hypertrophic, mycotic, dystrophic, brittle toenail(s) at nail 1, 2, 3, 4, 5 with onycholysis of the right foot. The 1st, 2nd, 3rd, 4th, 5th toenail(s) on the right have 2 mm in thickness with subungual detritus. There is an incurvated toenail at the distal border of the 1st, 2nd, 3rd, 4th, 5th toe  o Toes: Left Foot  o :   § Toenails  § : There are hypertrophic, mycotic, dystrophic, brittle toenail(s) at the 1, 2, 3, 4, 5 with onycholysis of the left foot. The 1st, 2nd, 3rd, 4th, 5th toenail(s) on the left have 2 mm in thickness with subungual detritus. There is an incurvated toenail at the distal border of the 1st, 2nd, 3rd, 4th, 5th toe  · Procedures  o Nail Debridement  o : Nail debridement is indicated for the following toenails:, left hallux, left 2nd toe, left 3rd toe, left 4th toe, left 5th toe, right hallux, right 2nd toe, right 3rd toe, right 4th toe, right 5th toe. The nail was debrided of excessive thickness to appropriate levels of comfort and contour using, nail nippers. The procedure was without complications          Assessment  · Diabetes mellitus type 2, noninsulin dependent       Type 2 diabetes mellitus without complications     250.00/E11.9  · Diabetic neuropathy       Type 2 diabetes mellitus with diabetic neuropathy, unspecified     250.60/E11.40  · Foot pain, bilateral       Pain in right foot     729.5/M79.671  Pain in left foot     729.5/M79.672  · Ingrowing nail     703.0/L60.0  · Polyneuropathy     356.9/G62.9  · Tinea unguium     110.1/B35.1      Plan  · Orders  o Debridement of six or more nails (24477) - - 03/23/2021  · Medications  o Medications have been Reconciled  o Transition of Care or Provider Policy  · Instructions  o Follow Up in 9 weeks  o I have discussed the findings of this evaluation with the patient. The discussion included a complete verbal explanation  of any changes in the examination results, diagnosis, and the current treatment plan. A schedule for future care needs was explained. If any questions should arise after returning home, I have encouraged the patient to feel free to contact Dr. Arana. The patient states understanding and agreement with this plan.   o Pt to monitor for problems and to contact Dr. Arana for follow-up should such signs occur. Patient states understanding and agreement with this plan.   o This note was transcibed by Radha Mims.  o Electronically Identified Patient Education Materials Provided Electronically  · Disposition  o Call or Return if symptoms worsen or persist.            Electronically Signed by: Seth Arana DPM -Author on March 23, 2021 08:19:19 AM

## 2021-06-06 NOTE — PROGRESS NOTES
"   Progress Note      Patient Name: Sidney Pantoja   Patient ID: [id number]   Sex: Male   YOB: 1960    Primary Care Provider: Timothy Denton MD   Referring Provider: Timothy Denton MD    Visit Date: May 21, 2021    Provider: Timothy Denton MD   [facility]   Location Address: [address] [place] [address], [address] [place] [address]  [id number]   Location Phone: [phone]          Chief Complaint     1 week f/u on DM2, HLD, HTN       History Of Present Illness  Sidney Pantoja is a 60 year old /White male who presents for evaluation and treatment of:      18.     DM-II: taking metformin/Glipizide and Jardiance. Jardiance was increased recently , Patient doesn't want any  GLP or insulin.  A1C is 9.3    current smoker   History of heart disease: c/w cardiology, stented in 2018\">60 years old multiple comorbidities uncontrolled diabetes typeII , HTN, HLD, Cardiac disease, CAD s/p Stent comes to the clinic to f/u on DM-II and blood work    Recent BW reviewed including A1C of 9.3 which improved from >18.     DM-II: taking metformin/Glipizide and Jardiance. Jardiance was increased recently , Patient doesn't want any  GLP or insulin.  A1C is 9.3    current smoker   History of heart disease: c/w cardiology, stented in 2018       Past Medical History  Disease Name Date Onset Notes   Arthritis --  --    Diabetes --  --    High cholesterol --  --    Hypertension --  --    Numbness in feet --  --    Screening for colon cancer 2018 --    Type 2 diabetes mellitus --  --          Past Surgical History  Procedure Name Date Notes   heart surgery --  --          Medication List  Name Date Started Instructions   ammonium lactate 12 % topical lotion 02/09/2021 apply to affected area by topical route 2 times a day for 30 days   aspirin 81 mg oral tablet,delayed release (DR/EC) 05/11/2021 take 1 tablet (81 mg) by oral route once daily for 90 days   atorvastatin 20 mg oral tablet 05/11/2021 TAKE 1 " TABLET BY MOUTH EVERY NIGHT AT BEDTIME for 90 days   diclofenac sodium 75 mg oral tablet,delayed release (DR/EC) 05/11/2021 TAKE 1 TABLET(75 MG) BY MOUTH TWICE DAILY for 30 days   glipizide 10 mg oral tablet 05/11/2021 take 1 tablet (10 mg) by oral route once daily before a meal for 30 days   Jardiance 25 mg oral tablet 05/11/2021 take 1 tablet (25 mg) by oral route once daily in the morning for 90 days   lisinopril 10 mg oral tablet 05/11/2021 take 1 tablet (10 mg) by oral route once daily for 90 days   metformin 1,000 mg oral tablet 05/11/2021 TAKE 1 TABLET BY MOUTH TWICE DAILY WITH MEALS for 90 days   metoprolol tartrate 50 mg oral tablet 05/11/2021 TAKE 1 TABLET BY MOUTH TWICE DAILY for 90 days   OneTouch Verio Flex meter miscellaneous misc 05/11/2021 test up to 4 times a day dx E11.9 90 day supply test strips and lancets         Allergy List  Allergen Name Date Reaction Notes   NO KNOWN DRUG ALLERGIES --  --  --        Allergies Reconciled  Family Medical History  Disease Name Relative/Age Notes   Heart Disease Father/   Father   FH: stroke  --          Social History  Finding Status Start/Stop Quantity Notes   Alcohol Use Never --/-- --  does not drink   lives with spouse --  --/-- --  --    . --  --/-- --  --    Recreational Drug Use Never --/-- --  no   Tobacco Current every day --/-- --  current every day smoker   Unemployed. --  --/-- --  --          Immunizations  NameDate Admin Mfg Trade Name Lot Number Route Inj VIS Given VIS Publication   [id number]/2021 [facility] Fluzone Quadrivalent [id number] LA 01/28/2021 08/15/2019   Comments:          Review of Systems  · Constitutional  o Denies  o : fatigue, fever  · Eyes  o Denies  o : discharge from eye, redness  · HENT  o Denies  o : headaches, congestion  · Cardiovascular  o Denies  o : chest pain, palpitations  · Respiratory  o Denies  o : shortness of breath, wheezing, cough  · Gastrointestinal  o Denies  o : vomiting, diarrhea,  constipation  · Genitourinary  o Denies  o : dysuria, hematuria  · Integument  o Denies  o : rash, new skin lesions  · Neurologic  o Denies  o : altered mental status, seizures  · Musculoskeletal  o Denies  o : weakness, joint swelling  · Psychiatric  o Denies  o : anxiety, depression  · Heme-Lymph  o Denies  o : lymph node enlargement, tenderness      Physical Examination  · Constitutional  o Appearance  o : alert, in no acute distress, well developed, well-nourished  · Head and Face  o Head  o : normocephalic, atraumatic, non tender, no palpable masses or nodules.  o Face  o : no facial lesions  · Respiratory  o Auscultation of Lungs  o : normal breath sounds throughout  · Cardiovascular  o Heart  o : Regular rate and rhythm, Normal S1,S2 , No cardiac murmers, No S3 or S4 gallop or rubs  · Gastrointestinal  o Abdominal Examination  o : abdomen soft, nontender, non distended, no rigidity, gaurding, rebound tenderness, no ventral or inguinal hernias present  o Liver and spleen  o : no hepatomegaly present, liver nontender to palpation, spleen not palpable              Assessment  · Diabetes mellitus, type 2     250.00/E11.9  c/w increased dose of jardiance and Glipizide+ metformin  · Nicotine dependence     305.1/F17.200  · HLD (hyperlipidemia)     272.4/E78.5  · HTN (hypertension)     401.9/I10  · Cardiac disease     429.9/I51.9      Plan  · Orders  o Smoking cessation counseling, 3-10 minutes [facility] (84854) - 305.1/F17.200 - 05/21/2021  o ACO-17: Screened for tobacco use AND received tobacco cessation intervention (4004F) - 305.1/F17.200 - 05/21/2021  o Low dose CT scan (LDCT) for lung cancer screening [facility] () - 305.1/F17.200 - 05/21/2021  o BMP [facility] (99926) - 250.00/E11.9, 272.4/E78.5, 401.9/I10, 305.1/F17.200 - 07/21/2021  o Hgb A1c [facility] (47787) - 250.00/E11.9 - 07/21/2021  o ACO-14: Influenza immunization administered or previously received [facility] () - - 05/21/2021  o ACO-39: Current medications updated and  reviewed (1159F, ) - - 05/21/2021  · Medications  o Medications have been Reconciled  o Transition of Care or Provider Policy  · Instructions  o Continue blood sugar monitoring daily and record. Bring your log to office visits. Call the office for readings below 70 and above 250 or any complications.  o Daily foot care. Avoid walking barefoot. Annual Dilated Eye Exam.  o Discussed with patient blood pressure monitoring, hemoglobin A1C levels need to be below 7.0, and LDL (Lipid) goals below 70.  o *Form of nicotine being used:   o Patient was strongly encouraged to discontinue use of any nicotine containing product or minimize the use of the product.  o Discussed smoking cessation and counseling with patient for over 3 minutes.  o Patient was educated/instructed on their diagnosis, treatment and medications prior to discharge from the clinic today.  o Patient was instructed to exercise regularly.  o Patient instructed to seek medical attention urgently for new or worsening symptoms.  o Call the office with any concerns or questions.  o Minutes spent with patient including greater than 50% in Education/Counseling/Care Coordination.  o Time spent with the patient was minutes, more than 50% face to face.  o Discussed Covid-19 precautions including, but not limited to, social distancing, avoid touching your face, and hand washing.   · Disposition  o Call or Return if symptoms worsen or persist.  o Follow Up PRN.  o Follow Up in 3 months.            Electronically Signed by: Timothy Denton MD -Author on May 21, 2021 09:08:35 AM

## 2021-06-06 NOTE — PROGRESS NOTES
Progress Note      Patient Name: Sidney Pantoja   Patient ID: [id number]   Sex: Male   YOB: 1960    Primary Care Provider: Timothy Denton MD   Referring Provider: Timothy Denton MD    Visit Date: May 11, 2021    Provider: Timothy Denton MD   Location: [facility]   Location Address: [address] [place] [address], [address] [place] [address]  [id number]   Location Phone: [phone]          Chief Complaint     Cardiac disease and diabetes follow-up       History Of Present Illness  Sidney Pantoja is a 60 year old /White male who presents for evaluation and treatment of:      60 years old with multiple comorbidities including uncontrolled type 2 diabetes, hypertension, hyperlipidemia, cardiac disease, stented coronary artery comes to the clinic today to follow-up on diabetes and cardiac disease.    Diabetes type 2; uncontrolled, patient is noncompliant.  Does not want any insulin or any GLP.  Patient does not check his blood sugar at home.    Patient reports that at work he had an EKG done and he was told that he might have old MI.    Patient denies any chest pain or shortness of breath on exertion.    History of cardiac disease; stents in 2018, patient is not following up with cardiologist.    Patient is a current smoker.       Past Medical History  Disease Name Date Onset Notes   Arthritis --  --    Diabetes --  --    High cholesterol --  --    Hypertension --  --    Numbness in feet --  --    Screening for colon cancer 2018 --    Type 2 diabetes mellitus --  --          Past Surgical History  Procedure Name Date Notes   heart surgery --  --          Medication List  Name Date Started Instructions   ammonium lactate 12 % topical lotion 02/09/2021 apply to affected area by topical route 2 times a day for 30 days   aspirin 81 mg oral tablet,delayed release (/EC) 05/11/2021 take 1 tablet (81 mg) by oral route once daily for 90 days   atorvastatin 20 mg oral tablet 05/11/2021 TAKE 1  TABLET BY MOUTH EVERY NIGHT AT BEDTIME for 90 days   diclofenac sodium 75 mg oral tablet,delayed release (DR/EC) 05/11/2021 TAKE 1 TABLET(75 MG) BY MOUTH TWICE DAILY for 30 days   glipizide 10 mg oral tablet 05/11/2021 take 1 tablet (10 mg) by oral route once daily before a meal for 30 days   lisinopril 10 mg oral tablet 05/11/2021 take 1 tablet (10 mg) by oral route once daily for 90 days   metformin 1,000 mg oral tablet 05/11/2021 TAKE 1 TABLET BY MOUTH TWICE DAILY WITH MEALS for 90 days   metoprolol tartrate 50 mg oral tablet 05/11/2021 TAKE 1 TABLET BY MOUTH TWICE DAILY for 90 days         Allergy List  Allergen Name Date Reaction Notes   NO KNOWN DRUG ALLERGIES --  --  --          Family Medical History  Disease Name Relative/Age Notes   Heart Disease Father/   Father   FH: stroke  --          Social History  Finding Status Start/Stop Quantity Notes   Alcohol Use Never --/-- --  does not drink   lives with spouse --  --/-- --  --    . --  --/-- --  --    Recreational Drug Use Never --/-- --  no   Tobacco Current every day --/-- --  current every day smoker   Unemployed. --  --/-- --  --          Immunizations  NameDate Admin Mfg Trade Name Lot Number Route Inj VIS Given VIS Publication   [id number]/28/2021 [facility] Fluzone Quadrivalent [id number]  LA 01/28/2021 08/15/2019   Comments:          Review of Systems  · Constitutional  o Denies  o : fatigue, fever  · Eyes  o Denies  o : discharge from eye, redness  · HENT  o Denies  o : headaches, congestion  · Cardiovascular  o Denies  o : chest pain, palpitations  · Respiratory  o Denies  o : shortness of breath, wheezing, cough  · Gastrointestinal  o Denies  o : vomiting, diarrhea, constipation  · Genitourinary  o Denies  o : dysuria, hematuria  · Integument  o Denies  o : rash, new skin lesions  · Neurologic  o Denies  o : altered mental status, seizures  · Musculoskeletal  o Denies  o : weakness, joint swelling  · Psychiatric  o Denies  o : anxiety,  depression  · Heme-Lymph  o Denies  o : lymph node enlargement, tenderness      Physical Examination  · Constitutional  o Appearance  o : alert, in no acute distress, well developed, well-nourished  · Head and Face  o Head  o : normocephalic, atraumatic, non tender, no palpable masses or nodules.  o Face  o : no facial lesions  · Eyes  o Vision  o : Acuity: grossly normal at distance, Conjuntivae: Normal, Sclerae white, Pupils: PERRL, Cornea: Clear, no lesions bilateral  · Respiratory  o Auscultation of Lungs  o : normal breath sounds throughout  · Cardiovascular  o Heart  o : Regular rate and rhythm, Normal S1,S2 , No cardiac murmers, No S3 or S4 gallop or rubs  · Neurologic  o Mental Status Examination  o : alert and oriented to time, place, and person., Cranial Nerves: grossly intact,   · Psychiatric  o Mood and Affect  o : normal mood and affect          Assessment  · Diabetes mellitus, type 2     250.00/E11.9  --Noncompliant    Should be taking glipizide, Metformin, will increase the Jardiance to 25 mg daily.    Starting insulin discussed with patient today as well; declined,    Patient is not checking his blood sugar at home; advised to check his blood sugar at home.    We will do the blood work, will see patient in 1 week for repeat discussion regarding the diabetes    I have discussed possible organ failures, infections with uncontrolled diabetes  · Hyperlipidemia     272.4/E78.5  · Cardiac disease     429.9/I51.9  History of coronary artery disease, cardiac stents in 2018, patient is taking aspirin/statin/beta-blocker    Currently patient is not following up with any cardiologist, referral offered but declined  · HTN (hypertension)     401.9/I10  Continue with current medications, blood pressure is controlled  · Arthritis     716.90/M19.90       Patient did not do the CT chest; will rediscuss it in the next       Plan  · Orders  o ACO-14: Influenza immunization administered or previously received [facility]  () - - 05/11/2021  o ACO-39: Current medications updated and reviewed (1159F, ) - - 05/11/2021  o Diabetes 2 Panel (Urine Microalbumin, CMP, Lipid, A1c, ) [facility] (20458, 78725, 22857, 24460) - 250.00/E11.9, 429.9/I51.9 - 05/11/2021  o EKG (Recording and Interpretation) [facility] (Done and read at [facility]) (09777) - 429.9/I51.9 - 05/11/2021  · Medications  o Jardiance 25 mg oral tablet   SIG: take 1 tablet (25 mg) by oral route once daily in the morning for 90 days   DISP: (90) Tablet with 1 refills  Prescribed on 05/11/2021     o OneTouch Verio Flex meter miscellaneous misc   SIG: test up to 4 times a day dx E11.9 90 day supply test strips and lancets   DISP: (1) Box with 0 refills  Prescribed on 05/11/2021     o aspirin 81 mg oral tablet,delayed release (DR/EC)   SIG: take 1 tablet (81 mg) by oral route once daily for 90 days   DISP: (90) Tablet with 2 refills  Refilled on 05/11/2021     o atorvastatin 20 mg oral tablet   SIG: TAKE 1 TABLET BY MOUTH EVERY NIGHT AT BEDTIME for 90 days   DISP: (90) Tablet with 0 refills  Refilled on 05/11/2021     o diclofenac sodium 75 mg oral tablet,delayed release (DR/EC)   SIG: TAKE 1 TABLET(75 MG) BY MOUTH TWICE DAILY for 30 days   DISP: (60) Tablet with 1 refills  Refilled on 05/11/2021     o glipizide 10 mg oral tablet   SIG: take 1 tablet (10 mg) by oral route once daily before a meal for 30 days   DISP: (30) Tablet with 1 refills  Refilled on 05/11/2021     o lisinopril 10 mg oral tablet   SIG: take 1 tablet (10 mg) by oral route once daily for 90 days   DISP: (90) Tablet with 1 refills  Refilled on 05/11/2021     o metformin 1,000 mg oral tablet   SIG: TAKE 1 TABLET BY MOUTH TWICE DAILY WITH MEALS for 90 days   DISP: (180) Tablet with 1 refills  Refilled on 05/11/2021     o metoprolol tartrate 50 mg oral tablet   SIG: TAKE 1 TABLET BY MOUTH TWICE DAILY for 90 days   DISP: (180) Tablet with 1 refills  Refilled on 05/11/2021     o Jardiance 10 mg oral tablet   SIG: take 1  tablet (10 mg) by oral route once daily in the morning for 90 days   DISP: (90) Tablet with 1 refills  Discontinued on 05/11/2021     o Medications have been Reconciled  o Transition of Care or Provider Policy  · Instructions  o Continue blood sugar monitoring daily and record. Bring your log to office visits. Call the office for readings below 70 and above 250 or any complications.  o Daily foot care. Avoid walking barefoot. Annual Dilated Eye Exam.  o Discussed with patient blood pressure monitoring, hemoglobin A1C levels need to be below 7.0, and LDL (Lipid) goals below 70.  o Patient was educated and given low cholesterol diet information.  o Recommended exercise program to assist with cholesterol, weight loss and overall health improvement.  o Advised that cheeses and other sources of dairy fats, animal fats, fast food, and the extras (candy, pastries, pies, doughnuts and cookies) all contain LDL raising nutrients. Advised to increase fruits, vegetables, whole grains, and to monitor portion sizes.   o Patient was educated/instructed on their diagnosis, treatment and medications prior to discharge from the clinic today.  o Patient counseled to reduce calorie intake.  o Patient was instructed to exercise regularly.  o Patient instructed to seek medical attention urgently for new or worsening symptoms.  o Call the office with any concerns or questions.  o Minutes spent with patient including greater than 50% in Education/Counseling/Care Coordination.  o Time spent with the patient was minutes, more than 50% face to face.  o Discussed Covid-19 precautions including, but not limited to, social distancing, avoid touching your face, and hand washing.   · Disposition  o Call or Return if symptoms worsen or persist.  o Follow Up PRN.  o Follow Up in 1 week.            Electronically Signed by: Timothy Denton MD -Author on May 11, 2021 11:50:36 AM

## 2021-07-02 PROBLEM — I51.9 CARDIAC DISEASE: Status: ACTIVE | Noted: 2021-01-01

## 2021-07-02 PROBLEM — E11.9 TYPE 2 DIABETES MELLITUS (HCC): Status: ACTIVE | Noted: 2021-01-01

## 2021-07-02 PROBLEM — E11.42 DIABETIC PERIPHERAL NEUROPATHY (HCC): Status: ACTIVE | Noted: 2021-01-01

## 2021-07-02 PROBLEM — Z95.5 STENTED CORONARY ARTERY: Status: ACTIVE | Noted: 2021-01-01

## 2021-07-02 PROBLEM — I10 HYPERTENSION: Status: ACTIVE | Noted: 2021-01-01

## 2021-07-02 PROBLEM — F17.200 SMOKING: Status: ACTIVE | Noted: 2021-01-01

## 2021-07-02 PROBLEM — I25.10 CORONARY ARTERY DISEASE INVOLVING NATIVE CORONARY ARTERY OF NATIVE HEART WITHOUT ANGINA PECTORIS: Status: ACTIVE | Noted: 2021-01-01

## 2021-07-02 NOTE — PROGRESS NOTES
"Chief Complaint  Patient is here to follow-up on chronic conditions and complaining of left knee pain.    Subjective         Sidney Pantoja is a 60 y.o. male who presents to [facility]  Patient with past medical history of uncontrolled type 2 diabetes, neuropathy, coronary artery disease with stented coronary arteries, cardiac disease, hypertension, smoking comes to the clinic today to follow-up on chronic conditions and complaining of left knee pain.    Left knee pain; chronic in nature, progressively getting worse.  Patient denies any radiating symptoms, numbness/tingling or weakness.    Diabetes type 2; patient only wants oral agent, declined insulin and GLP-1.  Last A1c above 9.0    Hypertension; controlled on current medication    Cardiac disease; controlled on current interventions    Smoking; does not want to stop smoking but willing to cut down little bit.    Diabetes neuropathy; controlled currently.    Patient denies any chest pain or shortness of breath, denies any history of COPD or asthma, not using any inhalers either.        Review of Systems   Objective   Vital Signs:   Vitals:    07/02/21 1330   BP: 120/80   Pulse: 74   Resp: 16   Temp: 96.9 °F (36.1 °C)   SpO2: 94%   Weight: 123 kg (271 lb)   Height: 182.9 cm (72\")      Body mass index is 36.75 kg/m².   Physical Exam  Vitals reviewed.   Constitutional:       Appearance: Normal appearance. He is well-developed.   HENT:      Head: Normocephalic and atraumatic.      Right Ear: External ear normal.      Left Ear: External ear normal.      Mouth/Throat:      Pharynx: No oropharyngeal exudate.   Eyes:      Conjunctiva/sclera: Conjunctivae normal.      Pupils: Pupils are equal, round, and reactive to light.   Cardiovascular:      Rate and Rhythm: Normal rate and regular rhythm.      Heart sounds: No murmur heard.   No friction rub. No gallop.    Pulmonary:      Effort: Pulmonary effort is normal.      Breath sounds: " Normal breath sounds. No wheezing or rhonchi.   Abdominal:      General: Bowel sounds are normal. There is no distension.      Palpations: Abdomen is soft.      Tenderness: There is no abdominal tenderness.   Skin:     General: Skin is warm and dry.   Neurological:      Mental Status: He is alert and oriented to person, place, and time.      Cranial Nerves: No cranial nerve deficit.   Psychiatric:         Mood and Affect: Mood and affect normal.         Behavior: Behavior normal.         Thought Content: Thought content normal.         Judgment: Judgment normal.               Assessment and Plan   Diagnoses and all orders for this visit:    1. Type 2 diabetes mellitus with hyperglycemia, without long-term current use of insulin (CMS/[facility]) (Primary)  Comments:  c/w glipizide/metformin/Jardiance,, declined GLP and insulin, improved last A1c will need another A1c in 2 months  Orders:  -     Hemoglobin A1c; Future  -     Basic metabolic panel; Future    2. Screen for colon cancer  -     Cologuard - Stool, Per Rectum; Future  -     Hemoglobin A1c; Future  -     Basic metabolic panel; Future    3. Diabetic peripheral neuropathy (CMS/[facility])  -     Hemoglobin A1c; Future  -     Basic metabolic panel; Future    4. Cardiac disease  Comments:  Patient does not want to see any cardiologist at this time as he is asymptomatic  Orders:  -     Hemoglobin A1c; Future  -     Basic metabolic panel; Future    5. Coronary artery disease involving native coronary artery of native heart without angina pectoris  -     Hemoglobin A1c; Future  -     Basic metabolic panel; Future    6. Stented coronary artery  Comments:  2008   Orders:  -     Hemoglobin A1c; Future  -     Basic metabolic panel; Future    7. Essential hypertension  Comments:  Controlled on current medications  Orders:  -     Hemoglobin A1c; Future  -     Basic metabolic panel; Future    8. Left knee pain, unspecified chronicity  Comments:  Chronic, will try FLexeril and Xray for  baseline   Orders:  -     cyclobenzaprine (FLEXERIL) 10 MG tablet; Take 1 tablet by mouth 2 (Two) Times a Day As Needed for Muscle Spasms.  Dispense: 60 tablet; Refill: 2  -     XR Knee 1 or 2 View Left  -     Hemoglobin A1c; Future  -     Basic metabolic panel; Future    9. Smoking  Comments:  cessation discussed     Sidney Pantoja  reports that he has been smoking. He has never used smokeless tobacco.. I have educated him on the risk of diseases from using tobacco products such as cancer, COPD and heart disease.     I advised him to quit and he is not willing to quit.    I spent 3  minutes counseling the patient.       I have discussed about diabetes/hypertension/cardiac disease in great detail; patient would like to continue with current medications management.  Left knee pain; agrees to start with Flexeril, patient reports that he does not want any surgical interventions at this time.  Will talk about PT/steroid injections in the future.      Follow Up   Return in about 11 weeks (around 9/17/2021).  Patient was given instructions and counseling regarding his condition or for health maintenance advice. Please see specific information pulled into the AVS if appropriate.

## 2021-10-12 NOTE — TELEPHONE ENCOUNTER
Caller: Sidney Pantoja    Relationship: Self    Best call back number: [phone]    What orders are you requesting (i.e. lab or imaging): LAB WORK     In what timeframe would the patient need to come in: 10/28/21     Where will you receive your lab/imaging services: [facility]     Additional notes: PLEASE CALL AND ADVISE

## 2021-10-28 NOTE — TELEPHONE ENCOUNTER
----- Message from Timothy Denton MD sent at 10/28/2021  5:09 PM EDT -----  Amazing improvement with your diabetes, your A1c is 6.8 now, I am very proud of you and you need to continue your current management.  Let me know if you need anything

## 2021-10-29 NOTE — PROGRESS NOTES
"Chief Complaint  Follow-up on diabetes/recent blood work and complaining of trigger finger    Subjective         Sidney Pantoja is a 61 y.o. male who presents to [facility]  61 years old male with past medical history of uncontrolled diabetes, hypertension, coronary artery disease/cardiac disease, smoking, comes to the clinic today to follow-up on chronic conditions and complaining of bilateral trigger finger and follow-up on recent blood work.    Recent blood work reviewed with patient; excellent improvement of A1c to 6.8.    Uncontrolled diabetes; patient is taking multiple oral agents and most recent A1c below 7.0  Hypertension; controlled on current medications  Neuropathy; controlled since improved diabetes as per patient and not taking gabapentin anymore.    Cardiac disease; asymptomatic    Patient denies any chest pain or shortness of breath on exertion.  Patient does report feeling good energy since controlled blood sugar.        Review of Systems   Objective   Vital Signs:   Vitals:    10/29/21 0747   BP: 120/76   Pulse: 88   Resp: 16   Temp: 97.5 °F (36.4 °C)   SpO2: 98%   Weight: 127 kg (280 lb)   Height: 182.9 cm (72\")      Body mass index is 37.97 kg/m².   Physical Exam  Vitals reviewed.   Constitutional:       Appearance: Normal appearance. He is well-developed.   HENT:      Head: Normocephalic and atraumatic.      Right Ear: External ear normal.      Left Ear: External ear normal.      Mouth/Throat:      Pharynx: No oropharyngeal exudate.   Eyes:      Conjunctiva/sclera: Conjunctivae normal.      Pupils: Pupils are equal, round, and reactive to light.   Cardiovascular:      Rate and Rhythm: Normal rate and regular rhythm.      Heart sounds: No murmur heard.  No friction rub. No gallop.    Pulmonary:      Effort: Pulmonary effort is normal.      Breath sounds: Normal breath sounds. No wheezing or rhonchi.   Abdominal:      General: Bowel sounds are normal. There is no " distension.      Palpations: Abdomen is soft.      Tenderness: There is no abdominal tenderness.   Skin:     General: Skin is warm and dry.   Neurological:      Mental Status: He is alert and oriented to person, place, and time.      Cranial Nerves: No cranial nerve deficit.   Psychiatric:         Mood and Affect: Mood and affect normal.         Behavior: Behavior normal.         Thought Content: Thought content normal.         Judgment: Judgment normal.                       Assessment and Plan   Diagnoses and all orders for this visit:    1. Type 2 diabetes mellitus with diabetic peripheral angiopathy without gangrene, without long-term current use of insulin (HCC) (Primary)  Comments:  A1c improved to 6.8, patient to continue with Metformin/Jardiance/glipizide.  Excellent improvement of A1c discussed    2. Diabetic peripheral neuropathy (HCC)  Comments:  Improved symptoms, patient is not taking gabapentin anymore    3. Coronary artery disease involving native coronary artery of native heart without angina pectoris  Comments:  Controlled symptoms    4. Stented coronary artery    5. Primary hypertension  Comments:  Controlled on losartan and amlodipine    6. Smoking  Comments:  Smoking cessation discussed    7. Trigger middle finger, unspecified laterality  Comments:  We will try primary conservative management first, no improvement and will consider steroid injections, patient is aware.  Orders:  -     Diclofenac Sodium (VOLTAREN) 1 % gel gel; Apply 4 g topically to the appropriate area as directed 4 (Four) Times a Day As Needed (pain).  Dispense: 60 g; Refill: 0    8. Needs flu shot  -     FluLaval/Fluarix/Fluzone >6 Months (0917-3321)      Patient to continue with current medication management, patient to follow-up back with me about trigger finger/bilateral.      Follow Up   Return in about 8 weeks (around 12/23/2021) for Annual physical.  Patient was given instructions and counseling regarding his condition or  for health maintenance advice. Please see specific information pulled into the AVS if appropriate.